# Patient Record
Sex: FEMALE | Race: WHITE | NOT HISPANIC OR LATINO | Employment: PART TIME | URBAN - METROPOLITAN AREA
[De-identification: names, ages, dates, MRNs, and addresses within clinical notes are randomized per-mention and may not be internally consistent; named-entity substitution may affect disease eponyms.]

---

## 2019-08-29 PROBLEM — H10.13 ALLERGIC CONJUNCTIVITIS OF BOTH EYES: Status: ACTIVE | Noted: 2019-08-29

## 2019-08-29 PROBLEM — L20.84 INTRINSIC ATOPIC DERMATITIS: Status: ACTIVE | Noted: 2019-08-29

## 2019-08-29 PROBLEM — J30.9 ALLERGIC RHINITIS: Status: ACTIVE | Noted: 2019-08-29

## 2019-08-29 PROBLEM — K21.9 LARYNGOPHARYNGEAL REFLUX (LPR): Status: ACTIVE | Noted: 2019-08-29

## 2019-08-31 PROBLEM — J30.89 ALLERGIC RHINITIS DUE TO MOLD: Status: ACTIVE | Noted: 2019-08-31

## 2019-08-31 PROBLEM — J30.1 CHRONIC SEASONAL ALLERGIC RHINITIS DUE TO POLLEN: Status: ACTIVE | Noted: 2019-08-31

## 2019-11-12 PROBLEM — R01.1 CARDIAC MURMUR, UNSPECIFIED: Status: ACTIVE | Noted: 2019-11-12

## 2019-11-12 PROBLEM — L40.0 PLAQUE PSORIASIS: Status: ACTIVE | Noted: 2019-11-12

## 2019-11-12 PROBLEM — J30.89 ALLERGIC RHINITIS DUE TO AMERICAN HOUSE DUST MITE: Status: ACTIVE | Noted: 2019-11-12

## 2019-11-12 PROBLEM — K21.00 GASTRO-ESOPHAGEAL REFLUX DISEASE WITH ESOPHAGITIS: Status: ACTIVE | Noted: 2019-11-12

## 2019-11-12 PROBLEM — L21.9 SEBORRHEA: Status: ACTIVE | Noted: 2019-11-12

## 2019-11-12 PROBLEM — E66.9 OBESITY, UNSPECIFIED: Status: ACTIVE | Noted: 2017-04-04

## 2019-11-12 PROBLEM — J45.20 MILD INTERMITTENT ASTHMA, UNCOMPLICATED: Status: ACTIVE | Noted: 2019-11-12

## 2020-05-14 PROBLEM — R13.14 PHARYNGOESOPHAGEAL DYSPHAGIA: Status: ACTIVE | Noted: 2020-05-14

## 2020-05-14 PROBLEM — K21.9 GASTROESOPHAGEAL REFLUX DISEASE WITHOUT ESOPHAGITIS: Status: ACTIVE | Noted: 2020-05-14

## 2020-05-21 ENCOUNTER — APPOINTMENT (OUTPATIENT)
Dept: LAB | Age: 25
End: 2020-05-21
Payer: COMMERCIAL

## 2020-05-21 DIAGNOSIS — K21.9 GASTROESOPHAGEAL REFLUX DISEASE WITHOUT ESOPHAGITIS: ICD-10-CM

## 2020-05-21 DIAGNOSIS — K90.41 GLUTEN INTOLERANCE: ICD-10-CM

## 2020-05-21 PROCEDURE — 83516 IMMUNOASSAY NONANTIBODY: CPT

## 2020-05-21 PROCEDURE — 36415 COLL VENOUS BLD VENIPUNCTURE: CPT

## 2020-05-28 LAB — MISCELLANEOUS LAB TEST RESULT: NORMAL

## 2020-07-06 PROBLEM — K21.9 REFLUX LARYNGITIS: Status: ACTIVE | Noted: 2020-07-06

## 2020-07-06 PROBLEM — J38.3 GLOTTIC INSUFFICIENCY: Status: ACTIVE | Noted: 2020-07-06

## 2020-07-06 PROBLEM — J38.01 PARESIS OF RIGHT VOCAL FOLD: Status: ACTIVE | Noted: 2020-07-06

## 2020-07-06 PROBLEM — E07.89 THYROID FULLNESS: Status: ACTIVE | Noted: 2020-07-06

## 2020-07-06 PROBLEM — J04.0 REFLUX LARYNGITIS: Status: ACTIVE | Noted: 2020-07-06

## 2020-07-06 PROBLEM — R49.0 DYSPHONIA: Status: ACTIVE | Noted: 2020-07-06

## 2020-07-06 PROBLEM — J38.4 LARYNGEAL EDEMA: Status: ACTIVE | Noted: 2020-07-06

## 2020-07-10 ENCOUNTER — HOSPITAL ENCOUNTER (OUTPATIENT)
Dept: RADIOLOGY | Age: 25
Discharge: HOME | End: 2020-07-10
Payer: COMMERCIAL

## 2020-07-10 DIAGNOSIS — Z01.818 PREOP TESTING: ICD-10-CM

## 2020-07-10 DIAGNOSIS — K21.00 GASTRO-ESOPHAGEAL REFLUX DISEASE WITH ESOPHAGITIS: ICD-10-CM

## 2020-07-10 DIAGNOSIS — E07.89 THYROID FULLNESS: ICD-10-CM

## 2020-07-10 PROCEDURE — 76536 US EXAM OF HEAD AND NECK: CPT

## 2020-07-10 PROCEDURE — U0003 INFECTIOUS AGENT DETECTION BY NUCLEIC ACID (DNA OR RNA); SEVERE ACUTE RESPIRATORY SYNDROME CORONAVIRUS 2 (SARS-COV-2) (CORONAVIRUS DISEASE [COVID-19]), AMPLIFIED PROBE TECHNIQUE, MAKING USE OF HIGH THROUGHPUT TECHNOLOGIES AS DESCRIBED BY CMS-2020-01-R: HCPCS

## 2020-07-11 LAB
INPATIENT: NORMAL
SARS-COV-2 RNA SPEC QL NAA+PROBE: NOT DETECTED

## 2020-07-20 ENCOUNTER — HOSPITAL ENCOUNTER (OUTPATIENT)
Dept: GASTROENTEROLOGY | Facility: HOSPITAL | Age: 25
Discharge: HOME | End: 2020-07-20
Attending: OTOLARYNGOLOGY
Payer: COMMERCIAL

## 2020-07-20 VITALS
HEART RATE: 96 BPM | TEMPERATURE: 98.2 F | RESPIRATION RATE: 18 BRPM | SYSTOLIC BLOOD PRESSURE: 133 MMHG | DIASTOLIC BLOOD PRESSURE: 85 MMHG | OXYGEN SATURATION: 99 %

## 2020-07-20 DIAGNOSIS — K21.00 GASTRO-ESOPHAGEAL REFLUX DISEASE WITH ESOPHAGITIS: ICD-10-CM

## 2020-07-20 PROCEDURE — 91038 ESOPH IMPED FUNCT TEST > 1HR: CPT

## 2020-07-20 PROCEDURE — 91020 GASTRIC MOTILITY STUDIES: CPT

## 2020-07-20 NOTE — PERIOPERATIVE NURSING NOTE
Patient brought in the room and educated on procedure  Lidocaine 2% topical solution inserted via nostrils  Manometry catheter inserted via left nostril and secured  10 liquid swallows, 10 viscous swallow and 1 rapid swallow performed  Patient Tolerated procedure  Catheter removed intact   Dual sensor PH probe inserted via left nostril and secured  Zephr recorder teachback performed and patient verbalized understanding  Patient instructed to return next day to have probe remove  Discharge instructions given and patient ambulated out of room in stable condition

## 2020-08-07 ENCOUNTER — APPOINTMENT (OUTPATIENT)
Dept: LAB | Age: 25
End: 2020-08-07
Payer: COMMERCIAL

## 2020-08-07 ENCOUNTER — TRANSCRIBE ORDERS (OUTPATIENT)
Dept: ADMINISTRATIVE | Age: 25
End: 2020-08-07

## 2020-08-07 DIAGNOSIS — D89.89 CHRONIC FATIGUE AND IMMUNE DYSFUNCTION SYNDROME (HCC): ICD-10-CM

## 2020-08-07 DIAGNOSIS — R05.9 COUGH: ICD-10-CM

## 2020-08-07 DIAGNOSIS — J35.1 LINGUAL TONSIL HYPERTROPHY: ICD-10-CM

## 2020-08-07 DIAGNOSIS — K21.9 GASTROESOPHAGEAL REFLUX DISEASE, ESOPHAGITIS PRESENCE NOT SPECIFIED: Primary | ICD-10-CM

## 2020-08-07 DIAGNOSIS — K21.9 GASTROESOPHAGEAL REFLUX DISEASE, ESOPHAGITIS PRESENCE NOT SPECIFIED: ICD-10-CM

## 2020-08-07 DIAGNOSIS — R53.82 CHRONIC FATIGUE AND IMMUNE DYSFUNCTION SYNDROME (HCC): ICD-10-CM

## 2020-08-07 DIAGNOSIS — E55.9 VITAMIN D DEFICIENCY: ICD-10-CM

## 2020-08-07 LAB
25(OH)D3 SERPL-MCNC: 16 NG/ML (ref 30–100)
IGA SERPL-MCNC: 98 MG/DL (ref 70–400)
IGG SERPL-MCNC: 1090 MG/DL (ref 700–1600)
IGM SERPL-MCNC: 72 MG/DL (ref 40–230)
T4 FREE SERPL-MCNC: 1.04 NG/DL (ref 0.76–1.46)

## 2020-08-07 PROCEDURE — 82306 VITAMIN D 25 HYDROXY: CPT

## 2020-08-07 PROCEDURE — 82785 ASSAY OF IGE: CPT

## 2020-08-07 PROCEDURE — 86430 RHEUMATOID FACTOR TEST QUAL: CPT

## 2020-08-07 PROCEDURE — 86618 LYME DISEASE ANTIBODY: CPT

## 2020-08-07 PROCEDURE — 86038 ANTINUCLEAR ANTIBODIES: CPT

## 2020-08-07 PROCEDURE — 36415 COLL VENOUS BLD VENIPUNCTURE: CPT

## 2020-08-07 PROCEDURE — 84439 ASSAY OF FREE THYROXINE: CPT

## 2020-08-07 PROCEDURE — 82784 ASSAY IGA/IGD/IGG/IGM EACH: CPT

## 2020-08-10 LAB
B BURGDOR IGG+IGM SER-ACNC: <0.91 ISR (ref 0–0.9)
RHEUMATOID FACT SER QL LA: NEGATIVE
RYE IGE QN: NEGATIVE
TOTAL IGE SMQN RAST: 92.3 KU/L (ref 0–113)

## 2020-09-09 PROBLEM — K21.00 GASTRO-ESOPHAGEAL REFLUX DISEASE WITH ESOPHAGITIS: Status: RESOLVED | Noted: 2019-11-12 | Resolved: 2020-09-09

## 2020-09-09 PROBLEM — E55.9 VITAMIN D DEFICIENCY: Status: ACTIVE | Noted: 2020-09-09

## 2020-09-23 PROCEDURE — 91038 ESOPH IMPED FUNCT TEST > 1HR: CPT | Performed by: INTERNAL MEDICINE

## 2020-09-23 PROCEDURE — 91010 ESOPHAGUS MOTILITY STUDY: CPT | Performed by: INTERNAL MEDICINE

## 2020-10-05 ENCOUNTER — TELEPHONE (OUTPATIENT)
Dept: DERMATOLOGY | Facility: CLINIC | Age: 25
End: 2020-10-05

## 2020-10-07 ENCOUNTER — OFFICE VISIT (OUTPATIENT)
Dept: DERMATOLOGY | Facility: CLINIC | Age: 25
End: 2020-10-07
Payer: COMMERCIAL

## 2020-10-07 VITALS — TEMPERATURE: 99.8 F

## 2020-10-07 DIAGNOSIS — L40.9 PSORIASIS: Primary | ICD-10-CM

## 2020-10-07 PROCEDURE — 99203 OFFICE O/P NEW LOW 30 MIN: CPT | Performed by: DERMATOLOGY

## 2020-10-07 RX ORDER — FLUOCINONIDE TOPICAL SOLUTION USP, 0.05% 0.5 MG/ML
SOLUTION TOPICAL 2 TIMES DAILY
Qty: 60 ML | Refills: 3 | Status: SHIPPED | OUTPATIENT
Start: 2020-10-07 | End: 2021-05-27

## 2020-10-07 RX ORDER — DESONIDE 0.5 MG/G
CREAM TOPICAL 2 TIMES DAILY
Qty: 15 G | Refills: 2 | Status: SHIPPED | OUTPATIENT
Start: 2020-10-07 | End: 2021-05-27

## 2020-10-13 ENCOUNTER — TELEPHONE (OUTPATIENT)
Dept: DERMATOLOGY | Facility: CLINIC | Age: 25
End: 2020-10-13

## 2020-10-26 ENCOUNTER — LAB (OUTPATIENT)
Dept: LAB | Age: 25
End: 2020-10-26
Payer: COMMERCIAL

## 2020-10-26 DIAGNOSIS — E55.9 VITAMIN D DEFICIENCY: ICD-10-CM

## 2020-10-26 DIAGNOSIS — L40.9 PSORIASIS: ICD-10-CM

## 2020-10-26 LAB — 25(OH)D3 SERPL-MCNC: 36.4 NG/ML (ref 30–100)

## 2020-10-26 PROCEDURE — 36415 COLL VENOUS BLD VENIPUNCTURE: CPT

## 2020-10-26 PROCEDURE — 86480 TB TEST CELL IMMUN MEASURE: CPT

## 2020-10-26 PROCEDURE — 82306 VITAMIN D 25 HYDROXY: CPT

## 2020-10-28 LAB
GAMMA INTERFERON BACKGROUND BLD IA-ACNC: 0.03 IU/ML
M TB IFN-G BLD-IMP: NEGATIVE
M TB IFN-G CD4+ BCKGRND COR BLD-ACNC: -0.01 IU/ML
M TB IFN-G CD4+ BCKGRND COR BLD-ACNC: -0.01 IU/ML
MITOGEN IGNF BCKGRD COR BLD-ACNC: >10 IU/ML

## 2020-11-16 DIAGNOSIS — L40.9 PSORIASIS: ICD-10-CM

## 2020-11-16 RX ORDER — GUSELKUMAB 100 MG/ML
INJECTION SUBCUTANEOUS
Qty: 1 PEN | Refills: 6 | Status: SHIPPED | OUTPATIENT
Start: 2020-11-16 | End: 2021-01-27

## 2021-01-27 ENCOUNTER — OFFICE VISIT (OUTPATIENT)
Dept: DERMATOLOGY | Facility: CLINIC | Age: 26
End: 2021-01-27
Payer: COMMERCIAL

## 2021-01-27 VITALS — WEIGHT: 220 LBS | TEMPERATURE: 99.5 F | HEIGHT: 68 IN | BODY MASS INDEX: 33.34 KG/M2

## 2021-01-27 DIAGNOSIS — L40.9 PSORIASIS: Primary | ICD-10-CM

## 2021-01-27 PROCEDURE — 99213 OFFICE O/P EST LOW 20 MIN: CPT | Performed by: DERMATOLOGY

## 2021-01-27 PROCEDURE — 11900 INJECT SKIN LESIONS </W 7: CPT | Performed by: DERMATOLOGY

## 2021-01-27 RX ORDER — TRIAMCINOLONE ACETONIDE 40 MG/ML
40 INJECTION, SUSPENSION INTRA-ARTICULAR; INTRAMUSCULAR ONCE
Status: COMPLETED | OUTPATIENT
Start: 2021-01-27 | End: 2021-01-27

## 2021-01-27 RX ORDER — GUSELKUMAB 100 MG/ML
INJECTION SUBCUTANEOUS
COMMUNITY
Start: 2020-11-05 | End: 2021-04-15 | Stop reason: ALTCHOICE

## 2021-01-27 RX ORDER — AZELASTINE 1 MG/ML
SPRAY, METERED NASAL
COMMUNITY
Start: 2020-11-17 | End: 2021-05-27

## 2021-01-27 RX ORDER — FLUCONAZOLE 150 MG/1
TABLET ORAL
COMMUNITY
End: 2021-05-27

## 2021-01-27 RX ADMIN — TRIAMCINOLONE ACETONIDE 40 MG: 40 INJECTION, SUSPENSION INTRA-ARTICULAR; INTRAMUSCULAR at 17:06

## 2021-01-27 NOTE — PATIENT INSTRUCTIONS
Assessment and Plan:  Based on a thorough discussion of this condition and the management approach to it (including a comprehensive discussion of the known risks, side effects and potential benefits of treatment), the patient (family) agrees to implement the following specific plan:   Continue Tremfya injection   Contact office if any issues develop   Kenalog injection administered today   Follow up end of April or beginning of May

## 2021-01-27 NOTE — PROGRESS NOTES
[facility] Follow Up Note    Patient Name: Hebert Melgar  Encounter Date: 01/27/2021    Today's Chief Concerns:  Lukas Jamar Concern #1:  Follow up for Tremfya     Current Medications:    Current Outpatient Medications:     aluminum hydroxide-magnesium carbonate (GAVISCON)  mg/15 mL oral suspension, Take 15 mL by mouth daily at bedtime, Disp: 355 mL, Rfl: 5    azelastine (ASTELIN) 0 1 % nasal spray, 1 2 SPRAYS INTO EACH NOSTRIL 2 (TWO) TIMES A DAY FOR 30 DAYS  USE IN EACH NOSTRIL AS DIRECTED, Disp: , Rfl:     cetirizine (ZyrTEC) 10 mg tablet, Take 10 mg by mouth daily, Disp: , Rfl:     desonide (DESOWEN) 0 05 % cream, Apply topically 2 (two) times a day, Disp: 15 g, Rfl: 2    famotidine (PEPCID) 20 mg tablet, Take 20 mg by mouth daily before dinner, Disp: , Rfl:     fluocinonide (LIDEX) 0 05 % external solution, Apply topically 2 (two) times a day, Disp: 60 mL, Rfl: 3    fluticasone (FLONASE) 50 mcg/act nasal spray, 1 spray into each nostril daily, Disp: , Rfl:     guselkumab (Tremfya) subcutaneous injection, , Disp: , Rfl:     ibuprofen (MOTRIN) 600 mg tablet, Take 600 mg by mouth 3 (three) times a day with meals, Disp: , Rfl: 0    LARISSIA 0 1-20 MG-MCG per tablet, TAKE 1 TABLET (20 MCG TOTAL) BY MOUTH DAILY  , Disp: , Rfl: 3    omeprazole (PriLOSEC) 40 MG capsule, TAKE 1 CAPSULE BY MOUTH EVERY DAY BEFORE BREAKFAST, Disp: 90 capsule, Rfl: 1    Albuterol Sulfate 108 (90 Base) MCG/ACT AEPB, Inhale, Disp: , Rfl:     cyclobenzaprine (FLEXERIL) 5 mg tablet, Take 5 mg by mouth daily at bedtime, Disp: , Rfl: 0    ergocalciferol (VITAMIN D2) 50,000 units, Take 1 capsule (50,000 Units total) by mouth once a week for 8 doses, Disp: 8 capsule, Rfl: 0    fluconazole (DIFLUCAN) 150 mg tablet, , Disp: , Rfl:     norethindrone-ethinyl estradiol-iron (ESTROSTEP FE) 1-20/1-30/1-35 MG-MCG TABS, Take by mouth daily, Disp: , Rfl:     Secukinumab 150 MG/ML SOSY, Inject 150 mg under the skin every 28 days X2, Disp: , Rfl:     CONSTITUTIONAL:   Vitals:    01/27/21 1610   Temp: 99 5 °F (37 5 °C)   Weight: 99 8 kg (220 lb)   Height: 5' 8" (1 727 m)           Specific Alerts:    Have you been seen by a [facility]atologist in the last 3 years? YES    Are you pregnant or planning to become pregnant? No    Are you currently or planning to be nursing or breast feeding? No    Allergies   Allergen Reactions    Cefixime Hives     As an infant - has taken amox/pcn/augmentin w/o reaction        May we call your Preferred Phone number to discuss your specific medical information? YES    May we leave a detailed message that includes your specific medical information? YES    Have you traveled outside of the [facility] in the past 3 months? No    Do you currently have a pacemaker or defibrillator? No    Do you have any artificial heart valves, joints, plates, screws, rods, stents, pins, etc? No   - If Yes, were any placed within the last 2 years? Do you require any medications prior to a surgical procedure? No   - If Yes, for which procedure? N/A   - If Yes, what medications to you require? N/A    Are you taking any medications that cause you to bleed more easily ("blood thinners") No    Have you ever experienced a rapid heartbeat with epinephrine? No    Have you ever been treated with "gold" (gold sodium thiomalate) therapy? No    [facility] can help with wrinkles, "laugh lines," facial volume loss, "double chin," "love handles," age spots, and more  Are you interested in learning today about some of the skin enhancement procedures that we offer? (If Yes, please provide more detail) No    Review of Systems:  Have you recently had or currently have any of the following?     · Fever or chills: No  · Night Sweats: No  · Headaches: YES, not all the time  · Weight Gain: No  · Weight Loss: No  · Blurry Vision: No  · Nausea: No  · Vomiting: No  · Diarrhea: No  · Blood in Stool: No  · Abdominal Pain: No  · Itchy Skin: YES  · Painful Joints: No  · Swollen Joints: No  · Muscle Pain: YES, not all the time  · Irregular Mole: No  · Sun Burn: No  · Dry Skin: YES  · Skin Color Changes: No  · Scar or Keloid: yes, keliod  · Cold Sores/Fever Blisters: No  · Bacterial Infections/MRSA: No  · Anxiety: YES  · Depression: No  · Suicidal or Homicidal Thoughts: No      PSYCH: Normal mood and affect  EYES: Normal conjunctiva  ENT: Normal lips and oral mucosa  CARDIOVASCULAR: No edema  RESPIRATORY: Normal respirations  HEME/LYMPH/IMMUNO:  No regional lymphadenopathy except as noted below in ASSESSMENT AND PLAN BY DIAGNOSIS    FULL ORGAN SYSTEM SKIN EXAM (SKIN)   Hair, Scalp, Ears, Face Normal except as noted below in Assessment   Neck, Cervical Chain Nodes Normal except as noted below in Assessment   Right Arm/Hand/Fingers Normal except as noted below in Assessment   Left Arm/Hand/Fingers Normal except as noted below in Assessment     PSORIASIS    Physical Exam:   Anatomic Location Affected:  Scalp, behind left ear   Morphological Description:  Diffuse thick scale adhered to scalp, thick erythametous plaques on occipital scalp and right frontal scalp   Severity: moderate    Body Percent Affected: <5%       Additional History of Present Condition:  Patient is present to follow up on scalp psoriasis since Tremfya treatment has been started  Denies palpitations  cosentyx worked the fastest for her but she thinks it was temporally related to her palpitations       Assessment and Plan:  Based on a thorough discussion of this condition and the management approach to it (including a comprehensive discussion of the known risks, side effects and potential benefits of treatment), the patient (family) agrees to implement the following specific plan:   Continue Tremfya injection - her 3rd one is due next week    Contact office if any issues develop   Kenalog injection administered today   Follow up end of April or beginning of May which will be post 4th injection     PROCEDURE:  INTRALESIONAL STEROID INJECTION (KENALOG INJECTION)    Purpose: Triamcinolone is a synthetic glucocorticoid corticosteroid that has marked anti-inflammatory action  It is prepared in sterile aqueous suspension suitable for injecting directly into a lesion on or immediately below the skin to treat a dermal inflammatory process  Indications: It is indicated for alopecia areata; inflammatory acne cysts; discoid lupus erythematosus; keloids and hypertrophic scars; inflammatory lesions of granuloma annulare, lichen planus, lichen simplex chronicus (neurodermatitis), psoriatic plaques, and other localized inflammatory skin conditions  Potential Side Effects: I understand that triamcinolone injection can potentially cause early and/or delayed adverse effects such as:    Pain    Impaired wound healing    Increased hair growth    Bleeding    White or brown marks    Steroid acne    Infection    Telangiectasia    Skin thinning    Cutaneous and subcutaneous lipoatrophy (most common) appearing as skin indentations or dimples around the injection sites a few weeks after treatment     PROCEDURE NOTE:  After verbal and written consent were obtained, the to-be-treated area was wiped and cleaned with rubbing alcohol 70%  Then, a total of 2 mL of Kenalog CONCENTRATION:  40 mg/mL   (Lot# [id number]; Expiration [id number], NDC#: 3644-4446-80) was injected intralesionally in divided 0 1 cc doses into a total of 2 lesion/s on the following anatomic areas:  Right frontal scalp and occipital scalp using a 1-mL syringe and a 30 and 1/2-gauge needle  There was less than 1 mL of blood loss and little to no discomfort  The area was bandaged with a Band-aid  The patient tolerated the procedure well and remained in the office for observation  With no signs of an adverse reaction, the patient was eventually discharged from clinic        Scribe Attestation    I,:  Darinel White am acting as a scribe while in the presence of the attending physician :       I,:  John Andrade MD personally performed the services described in this documentation    as scribed in my presence :

## 2021-04-15 ENCOUNTER — TELEPHONE (OUTPATIENT)
Dept: DERMATOLOGY | Facility: CLINIC | Age: 26
End: 2021-04-15

## 2021-04-15 ENCOUNTER — OFFICE VISIT (OUTPATIENT)
Dept: DERMATOLOGY | Facility: CLINIC | Age: 26
End: 2021-04-15
Payer: COMMERCIAL

## 2021-04-15 VITALS — TEMPERATURE: 98.4 F | WEIGHT: 226 LBS | BODY MASS INDEX: 34.25 KG/M2 | HEIGHT: 68 IN

## 2021-04-15 DIAGNOSIS — L71.9 ROSACEA: ICD-10-CM

## 2021-04-15 DIAGNOSIS — L40.9 PSORIASIS: Primary | ICD-10-CM

## 2021-04-15 PROCEDURE — 99213 OFFICE O/P EST LOW 20 MIN: CPT | Performed by: DERMATOLOGY

## 2021-04-15 RX ORDER — SECUKINUMAB 150 MG/ML
INJECTION SUBCUTANEOUS
Qty: 2 ML | Refills: 8 | Status: SHIPPED | OUTPATIENT
Start: 2021-04-15 | End: 2021-04-26 | Stop reason: ALTCHOICE

## 2021-04-15 NOTE — PROGRESS NOTES
[facility] Follow Up Note    Patient Name: Cammie Miller  Encounter Date: 4/15/2021    Today's Chief Concerns:  Scott Brasherdequan Concern #1:  Psoriasis Follow Up      Current Medications:    Current Outpatient Medications:     Albuterol Sulfate 108 (90 Base) MCG/ACT AEPB, Inhale, Disp: , Rfl:     aluminum hydroxide-magnesium carbonate (GAVISCON)  mg/15 mL oral suspension, Take 15 mL by mouth daily at bedtime, Disp: 355 mL, Rfl: 5    azelastine (ASTELIN) 0 1 % nasal spray, 1 2 SPRAYS INTO EACH NOSTRIL 2 (TWO) TIMES A DAY FOR 30 DAYS  USE IN EACH NOSTRIL AS DIRECTED, Disp: , Rfl:     cetirizine (ZyrTEC) 10 mg tablet, Take 10 mg by mouth daily, Disp: , Rfl:     cyclobenzaprine (FLEXERIL) 5 mg tablet, Take 5 mg by mouth daily at bedtime, Disp: , Rfl: 0    desonide (DESOWEN) 0 05 % cream, Apply topically 2 (two) times a day, Disp: 15 g, Rfl: 2    famotidine (PEPCID) 20 mg tablet, Take 20 mg by mouth daily before dinner, Disp: , Rfl:     fluconazole (DIFLUCAN) 150 mg tablet, , Disp: , Rfl:     fluocinonide (LIDEX) 0 05 % external solution, Apply topically 2 (two) times a day, Disp: 60 mL, Rfl: 3    fluticasone (FLONASE) 50 mcg/act nasal spray, 1 spray into each nostril daily, Disp: , Rfl:     guselkumab (Tremfya) subcutaneous injection, , Disp: , Rfl:     ibuprofen (MOTRIN) 600 mg tablet, Take 600 mg by mouth 3 (three) times a day with meals, Disp: , Rfl: 0    LARISSIA 0 1-20 MG-MCG per tablet, TAKE 1 TABLET (20 MCG TOTAL) BY MOUTH DAILY  , Disp: , Rfl: 3    norethindrone-ethinyl estradiol-iron (ESTROSTEP FE) 1-20/1-30/1-35 MG-MCG TABS, Take by mouth daily, Disp: , Rfl:     omeprazole (PriLOSEC) 40 MG capsule, TAKE 1 CAPSULE BY MOUTH EVERY DAY BEFORE BREAKFAST, Disp: 90 capsule, Rfl: 1    Secukinumab 150 MG/ML SOSY, Inject 150 mg under the skin every 28 days X2, Disp: , Rfl:     ergocalciferol (VITAMIN D2) 50,000 units, Take 1 capsule (50,000 Units total) by mouth once a week for 8 doses, Disp: 8 capsule, Rfl: 0    CONSTITUTIONAL:   Vitals:    04/15/21 0801   Temp: 98 4 °F (36 9 °C)   Weight: 103 kg (226 lb)   Height: 5' 8" (1 727 m)       Specific Alerts:    Have you been seen by a [facility]ologist in the last 3 years? YES    Are you pregnant or planning to become pregnant? No    Are you currently or planning to be nursing or breast feeding? No    Allergies   Allergen Reactions    Cefixime Hives     As an infant - has taken amox/pcn/augmentin w/o reaction        May we call your Preferred Phone number to discuss your specific medical information? YES    May we leave a detailed message that includes your specific medical information? YES    Have you traveled outside of the [facility] in the past 3 months? No    Do you currently have a pacemaker or defibrillator? No    Do you have any artificial heart valves, joints, plates, screws, rods, stents, pins, etc? No   - If Yes, were any placed within the last 2 years? Do you require any medications prior to a surgical procedure? No   - If Yes, for which procedure? - If Yes, what medications to you require? Are you taking any medications that cause you to bleed more easily ("blood thinners") No    Have you ever experienced a rapid heartbeat with epinephrine? No    Have you ever been treated with "gold" (gold sodium thiomalate) therapy? No    [address] [facility] can help with wrinkles, "laugh lines," facial volume loss, "double chin," "love handles," age spots, and more  Are you interested in learning today about some of the skin enhancement procedures that we offer? (If Yes, please provide more detail) No    Review of Systems:  Have you recently had or currently have any of the following?     · Fever or chills: No  · Night Sweats: No  · Headaches: No  · Weight Gain: No  · Weight Loss: No  · Blurry Vision: No  · Nausea: No  · Vomiting: No  · Diarrhea: No  · Blood in Stool: No  · Abdominal Pain: No  · Itchy Skin: No  · Painful Joints: No  · Swollen Joints: No  · Muscle Pain: No  · Irregular Mole: No  · Sun Burn: No  · Dry Skin: No  · Skin Color Changes: No  · Scar or Keloid: No  · Cold Sores/Fever Blisters: No  · Bacterial Infections/MRSA: No  · Anxiety: No  · Depression: No  · Suicidal or Homicidal Thoughts: No      PSYCH: Normal mood and affect  EYES: Normal conjunctiva  ENT: Normal lips and oral mucosa  CARDIOVASCULAR: No edema  RESPIRATORY: Normal respirations  HEME/LYMPH/IMMUNO:  No regional lymphadenopathy except as noted below in ASSESSMENT AND PLAN BY DIAGNOSIS    FULL ORGAN SYSTEM SKIN EXAM (SKIN)  Hair, Scalp, Ears, Face Normal except as noted below in Assessment   Neck, Cervical Chain Nodes Normal except as noted below in Assessment   Right Arm/Hand/Fingers Normal except as noted below in Assessment   Left Arm/Hand/Fingers Normal except as noted below in Assessment       PSORIASIS     Physical Exam:  · Anatomic Location Affected:  Scalp, behind left ear  · Morphological Description:  Diffuse thick scale adhered to scalp  · Severity: moderate   · Body Percent Affected: <5%         Additional History of Present Condition:  Patient states is not seeing an improvement with the Tremfya - she is 2 weeks post her 4th injection  She had previously taken Cosentyx for her Psoriasis, which she feels helped  Currently, she feels her skin is slightly better; but feels that is because she is using her topical medications more in conjunction with the Tremfya  Needing to use her topicals continuously and flares as soon as she stops     Assessment and Plan:  Based on a thorough discussion of this condition and the management approach to it (including a comprehensive discussion of the known risks, side effects and potential benefits of treatment), the patient (family) agrees to implement the following specific plan:  · Discussed stopping Tremfya and starting Cosentyx    · The ultimate goal is to be able to stop using topical medications completely and be clear with only the injectable  · Discontinue use of Tremfya  She has failed tremfya and topical steroids   · Begin using Cosentyx  Start with 300 mg injection on weeks 0, 1, 2, 3, and 4    Beginning at week 8 inject 300 mg once monthly         ROSACEA  Physical Exam:   Anatomic Location Affected:  Whole face   Morphological Description:  Diffuse erythema  Additional History of Present Condition:  Not used any prescription treatments before     Assessment and Plan:  Based on a thorough discussion of this condition and the management approach to it (including a comprehensive discussion of the known risks, side effects and potential benefits of treatment), the patient (family) agrees to implement the following specific plan:   Start rhofade qAM - sent thru [facility]           Scribe Attestation    I,:  Raina Armenta am acting as a scribe while in the presence of the attending physician :       I,:  Patricia Raza MD personally performed the services described in this documentation    as scribed in my presence :

## 2021-04-15 NOTE — TELEPHONE ENCOUNTER
Prior auth paper work completed, signed by Dr Teresa Rivera and sent to [address] [place], [place] [address]rachell

## 2021-04-15 NOTE — PROGRESS NOTES
[facility] Follow Up Note    Patient Name: Daniel Green  Encounter Date: ***    Today's Chief Concerns:  Krystal Alejandre Concern #1:  Willie Nichols Concern #2:  Willie Nichols Concern #3:  ***    Current Medications:    Current Outpatient Medications:     Albuterol Sulfate 108 (90 Base) MCG/ACT AEPB, Inhale, Disp: , Rfl:     aluminum hydroxide-magnesium carbonate (GAVISCON)  mg/15 mL oral suspension, Take 15 mL by mouth daily at bedtime, Disp: 355 mL, Rfl: 5    azelastine (ASTELIN) 0 1 % nasal spray, 1 2 SPRAYS INTO EACH NOSTRIL 2 (TWO) TIMES A DAY FOR 30 DAYS  USE IN EACH NOSTRIL AS DIRECTED, Disp: , Rfl:     cetirizine (ZyrTEC) 10 mg tablet, Take 10 mg by mouth daily, Disp: , Rfl:     cyclobenzaprine (FLEXERIL) 5 mg tablet, Take 5 mg by mouth daily at bedtime, Disp: , Rfl: 0    desonide (DESOWEN) 0 05 % cream, Apply topically 2 (two) times a day, Disp: 15 g, Rfl: 2    ergocalciferol (VITAMIN D2) 50,000 units, Take 1 capsule (50,000 Units total) by mouth once a week for 8 doses, Disp: 8 capsule, Rfl: 0    famotidine (PEPCID) 20 mg tablet, Take 20 mg by mouth daily before dinner, Disp: , Rfl:     fluconazole (DIFLUCAN) 150 mg tablet, , Disp: , Rfl:     fluocinonide (LIDEX) 0 05 % external solution, Apply topically 2 (two) times a day, Disp: 60 mL, Rfl: 3    fluticasone (FLONASE) 50 mcg/act nasal spray, 1 spray into each nostril daily, Disp: , Rfl:     guselkumab (Tremfya) subcutaneous injection, , Disp: , Rfl:     ibuprofen (MOTRIN) 600 mg tablet, Take 600 mg by mouth 3 (three) times a day with meals, Disp: , Rfl: 0    LARISSIA 0 1-20 MG-MCG per tablet, TAKE 1 TABLET (20 MCG TOTAL) BY MOUTH DAILY  , Disp: , Rfl: 3    norethindrone-ethinyl estradiol-iron (ESTROSTEP FE) 1-20/1-30/1-35 MG-MCG TABS, Take by mouth daily, Disp: , Rfl:     omeprazole (PriLOSEC) 40 MG capsule, TAKE 1 CAPSULE BY MOUTH EVERY DAY BEFORE BREAKFAST, Disp: 90 capsule, Rfl: 1    Secukinumab 150 MG/ML SOSY, Inject 150 mg under the skin every 28 days X2, Disp: , Rfl:     CONSTITUTIONAL:   There were no vitals filed for this visit  Today's Height:   ***  Today's Weight (in kilograms):   ***  Today's Temperature:   ***    Specific Alerts:    Have you been seen by a [facility] in the last 3 years? {YES/NO:40059}    Are you pregnant or planning to become pregnant? {YES/NO/NA:41923}    Are you currently or planning to be nursing or breast feeding? {YES/NO/NA:50985}    Allergies   Allergen Reactions    Cefixime Hives     As an infant - has taken amox/pcn/augmentin w/o reaction        May we call your Preferred Phone number to discuss your specific medical information? {YES/NO:23631}    May we leave a detailed message that includes your specific medical information? {YES/NO:06530}    Have you traveled outside of the [facility] in the past 3 months? {YES/NO:78867}    Do you currently have a pacemaker or defibrillator? {YES/NO:85411}    Do you have any artificial heart valves, joints, plates, screws, rods, stents, pins, etc? {YES/NO:45321}   - If Yes, were any placed within the last 2 years? Do you require any medications prior to a surgical procedure? {YES/NO:12377}   - If Yes, for which procedure? ***   - If Yes, what medications to you require? ***    Are you taking any medications that cause you to bleed more easily ("blood thinners") {YES/NO:02439}    Have you ever experienced a rapid heartbeat with epinephrine? {YES/NO:52263}    Have you ever been treated with "gold" (gold sodium thiomalate) therapy? {YES/NO:95219}    [facility] can help with wrinkles, "laugh lines," facial volume loss, "double chin," "love handles," age spots, and more  Are you interested in learning today about some of the skin enhancement procedures that we offer? (If Yes, please provide more detail) {YES/NO W/DESCRIPTION:78532}    Review of Systems:  Have you recently had or currently have any of the following?     · Fever or chills: {YES/NO:60171}  · Night Sweats: {YES/NO:48079}  · Headaches: {YES/NO:11320}  · Weight Gain: {YES/NO:61304}  · Weight Loss: {YES/NO:95484}  · Blurry Vision: {YES/NO:06696}  · Nausea: {YES/NO:10909}  · Vomiting: {YES/NO:55070}  · Diarrhea: {YES/NO:84712}  · Blood in Stool: {YES/NO:36224}  · Abdominal Pain: {YES/NO:38669}  · Itchy Skin: {YES/NO:90931}  · Painful Joints: {YES/NO:60269}  · Swollen Joints: {YES/NO:80483}  · Muscle Pain: {YES/NO:95774}  · Irregular Mole: {YES/NO:68880}  · Sun Burn: {YES/NO:13304}  · Dry Skin: {YES/NO:08219}  · Skin Color Changes: {YES/NO:48613}  · Scar or Keloid: {YES/NO:72166}  · Cold Sores/Fever Blisters: {YES/NO:65350}  · Bacterial Infections/MRSA: {YES/NO:49104}  · Anxiety: {YES/NO:16949}  · Depression: {YES/NO:31367}  · Suicidal or Homicidal Thoughts: {YES/NO:53795}      PSYCH: Normal mood and affect  EYES: Normal conjunctiva  ENT: Normal lips and oral mucosa  CARDIOVASCULAR: No edema  RESPIRATORY: Normal respirations  HEME/LYMPH/IMMUNO:  No regional lymphadenopathy except as noted below in ASSESSMENT AND PLAN BY DIAGNOSIS    FULL ORGAN SYSTEM SKIN EXAM (SKIN) *** DELETE WHEN COMPLETE  Hair, Scalp, Ears, Face Normal except as noted below in Assessment   Neck, Cervical Chain Nodes Normal except as noted below in Assessment   Right Arm/Hand/Fingers Normal except as noted below in Assessment   Left Arm/Hand/Fingers Normal except as noted below in Assessment   Chest/Breasts/Axillae Viewed areas Normal except as noted below in Assessment   Abdomen, Umbilicus Normal except as noted below in Assessment   Back/Spine Normal except as noted below in Assessment   Groin/Genitalia/Buttocks Viewed areas Normal except as noted below in Assessment   Right Leg, Foot, Toes Normal except as noted below in Assessment   Left Leg, Foot, Toes Normal except as noted below in Assessment       TYPE HERE

## 2021-04-15 NOTE — PATIENT INSTRUCTIONS
PSORIASIS     Assessment and Plan:  Based on a thorough discussion of this condition and the management approach to it (including a comprehensive discussion of the known risks, side effects and potential benefits of treatment), the patient (family) agrees to implement the following specific plan:  · Discussed stopping Tremfya and starting a 'cousin' medication of Cosentyx  · The ultimate goal is to be able to stop using topical medications completely and be clear with only the injectable  · Discontinue use of Tremfya  · Begin using Cosentyx  Start with 300 mg injection on weeks 0, 1, 2, 3, and 4  Beginning at week 8 inject 300 mg once monthly

## 2021-04-15 NOTE — TELEPHONE ENCOUNTER
Prior auth fax received from [place]  It was scanned into pt MR  Please review

## 2021-04-26 DIAGNOSIS — L40.9 PSORIASIS: Primary | ICD-10-CM

## 2021-05-04 ENCOUNTER — TELEPHONE (OUTPATIENT)
Dept: DERMATOLOGY | Facility: CLINIC | Age: 26
End: 2021-05-04

## 2021-05-14 NOTE — TELEPHONE ENCOUNTER
Dinesh Ramirez was approved  Can you please call and check if she was able to get the medicine and get it started?

## 2021-05-17 NOTE — TELEPHONE ENCOUNTER
Left message for pt asking that she call the office back to confirm if she was able to  her medication   used

## 2021-05-18 NOTE — TELEPHONE ENCOUNTER
Pt returned call, stated that she is still working on getting the medication  Asked pt to contact us when she does get the medication and/or if she has any problems

## 2021-06-02 ENCOUNTER — TELEPHONE (OUTPATIENT)
Dept: DERMATOLOGY | Facility: CLINIC | Age: 26
End: 2021-06-02

## 2021-06-02 NOTE — TELEPHONE ENCOUNTER
Pt advised she did rec medication lxekizumab and wants to know if its ok to start this since she is now on an antibiotic (amoxicillin and predisone), please advise asap

## 2021-06-08 PROBLEM — Z01.419 ENCOUNTER FOR GYNECOLOGICAL EXAMINATION WITHOUT ABNORMAL FINDING: Status: ACTIVE | Noted: 2018-01-10

## 2021-07-15 ENCOUNTER — TELEPHONE (OUTPATIENT)
Dept: BARIATRICS | Facility: CLINIC | Age: 26
End: 2021-07-15

## 2021-07-15 NOTE — TELEPHONE ENCOUNTER
----- Message from Rhonda Villarreal MD sent at 7/14/2021  9:45 AM EDT -----  Please schedule this patient for a new reflux consult   ----- Message -----  From: Meghan Luna MD  Sent: 7/13/2021  12:29 PM EDT  To: Rhonda Villarreal MD    Sending new referral for this pt - reflux surgery, possible bariatric surgery considerations  Her insurance will change in Sept   Not sure if previous referral from 2020 was pursued by your office

## 2021-07-30 ENCOUNTER — TELEPHONE (OUTPATIENT)
Dept: DERMATOLOGY | Facility: CLINIC | Age: 26
End: 2021-07-30

## 2021-08-23 ENCOUNTER — TELEPHONE (OUTPATIENT)
Dept: DERMATOLOGY | Facility: CLINIC | Age: 26
End: 2021-08-23

## 2021-08-23 NOTE — TELEPHONE ENCOUNTER
Michelle Castaneda from [facility] called stating that a form for the Reidne Call is being sent over today  She stated that if it is not received that we can call to verbally complete the form  To Verbally complete the form call [phone]      Any other question can call [phone]

## 2021-08-23 NOTE — TELEPHONE ENCOUNTER
Called and spoke [facility] to to fill out PLQE for the pt's Helen Bustillo  I was told that the PA was already done and now that I filled out another PLQE there is a second PA on file for the pt        The PA from April includes the loading dose and the maintenance doses  The PA from today includes only maintenance doses        There is no need for a second loading dose  The person I spoke to was unsure of why Claudia Killian from the pharmacy was asking for a second loading dose to be authorized

## 2021-08-23 NOTE — TELEPHONE ENCOUNTER
Called and spoke [facility] to to fill out PLQE for the pt's Sameul Her  I was told that the PA was already done and now that I filled out another PLQE there is a second PA on file for the pt  The PA from April includes the loading dose and the maintenance doses  The PA from today includes only maintenance doses  There is no need for a second loading dose  The person I spoke to was unsure of why Yeny Rogers from the pharmacy was asking for a second loading dose to be authorized

## 2021-08-23 NOTE — TELEPHONE ENCOUNTER
Voicemail left by Alivia Kumar from [employer] stating patient did not get the second loading dose of Taltz and that it requires a PA  Stating they were faxing the information today  If nothing was received a call can be made to PA team at [phone] and fill out a PQLE form over the phone

## 2021-09-02 NOTE — TELEPHONE ENCOUNTER
Returned pt's phone call  Left a message stating the Jacobo Pair has a prior authorization and I spoke to [facility] and did a second authorization at their request   By their own admission there should be no issue with the pt getting her medication  Advised the pt to call [facility] directly and confirm with them that they have the prior auth and the correct shipping info for her  Left the phone number for the pharmacy on the voicemail  Asked that she call back with any questions or concerns

## 2021-09-02 NOTE — TELEPHONE ENCOUNTER
Spoke to Dorina Hills at [facility] regarding the pt's Jason Bending order  They state there is a prior auth on file that is good until 4/2022  The confusion is because they can't find record of the pt receiving her loading dose  I confirmed with the pt and the pharmacy she has received the loading dose and has taken it and she is now overdue for her maintenance doses  Per Yuriy Groves he has to put an override through in their system to get the maintenance dose sent to the pt, because according to what he is seeing she isn't due for it until 9/28/21, which is incorrect  Per Yuriy Groves there overrides were put through correctly on their end and he is unsure why the shipping schedule has been set up the way it has  He informed me I need to speak to the help desk about it  Help Desk phone [phone]  The rep spoke to the help desk and tried to get them to do an override, but was told there is a specific department that has to do the override for the pt's prescription  Everything has been resolved on [facility]'s end per the rep  The maintenance dose has been approved under the existing prior auth  The next dose will ship 9/8 directly to the pt  And every 28 days there after

## 2021-09-02 NOTE — TELEPHONE ENCOUNTER
Patient called back, she spoke with pharmacy and for some reason they got the PA request but prescription does not match medication that approved  Please call [facility] specialty pharmacy and reify what medication they need information on

## 2021-09-08 ENCOUNTER — TELEPHONE (OUTPATIENT)
Dept: DERMATOLOGY | Facility: CLINIC | Age: 26
End: 2021-09-08

## 2021-09-08 NOTE — TELEPHONE ENCOUNTER
She should treat the upcoming dose (tomorrow) as her 3rd and continue with the treatment as she normally would  She should have a follow up about 2-3 months after she started her injectable  If it's already been that long, an appointment as soon as possible is fine  Thanks, Mark Luna   Please let her know and help schedule her follow-up

## 2021-09-08 NOTE — TELEPHONE ENCOUNTER
Attempt to reach patient  Left a detailed message relying information below and for patient to call and schedule a follow up appointment

## 2021-09-08 NOTE — TELEPHONE ENCOUNTER
Patient left a message stating she is about 2 weeks over due to take her third dose of Jarrett Magallon which she is going to take tomorrow but that actually her 4th dose would be due tomorrow but she is behind and asking if it is okay to stay with the third dose tomorrow and then do the 4th dose when it would be due next? Patient asking how a follow up should be handled   Thank you

## 2021-09-13 ENCOUNTER — OFFICE VISIT (OUTPATIENT)
Dept: DERMATOLOGY | Facility: CLINIC | Age: 26
End: 2021-09-13
Payer: COMMERCIAL

## 2021-09-13 VITALS — WEIGHT: 232 LBS | TEMPERATURE: 99.4 F | HEIGHT: 68 IN | BODY MASS INDEX: 35.16 KG/M2

## 2021-09-13 DIAGNOSIS — L40.9 PSORIASIS: ICD-10-CM

## 2021-09-13 DIAGNOSIS — L71.9 ROSACEA: Primary | ICD-10-CM

## 2021-09-13 PROCEDURE — 99213 OFFICE O/P EST LOW 20 MIN: CPT | Performed by: DERMATOLOGY

## 2021-09-13 NOTE — PROGRESS NOTES
[facility] Follow Up Note    Patient Name: Kristopher Ward  Encounter Date: 9/13/2021    Today's Chief Concerns:  Learta January Concern #1:  Follow up psoriasis       Current Medications:    Current Outpatient Medications:     cetirizine (ZyrTEC) 10 mg tablet, Take 10 mg by mouth daily, Disp: , Rfl:     famotidine (PEPCID) 20 mg tablet, Take 20 mg by mouth daily before dinner, Disp: , Rfl:     fluticasone (FLONASE) 50 mcg/act nasal spray, 1 spray into each nostril daily, Disp: 18 2 mL, Rfl: 11    ibuprofen (MOTRIN) 600 mg tablet, Take 600 mg by mouth 3 (three) times a day with meals, Disp: , Rfl: 0    Ixekizumab 80 MG/ML SOAJ, 160 mg SC for first dose then 80 mg every 2 weeks for three months then once a month starting week 16, Disp: 2 Syringe, Rfl: 8    LARISSIA 0 1-20 MG-MCG per tablet, TAKE 1 TABLET (20 MCG TOTAL) BY MOUTH DAILY  , Disp: , Rfl: 3    omeprazole (PriLOSEC) 40 MG capsule, TAKE 1 CAPSULE BY MOUTH EVERY DAY BEFORE BREAKFAST, Disp: 90 capsule, Rfl: 1    Albuterol Sulfate 108 (90 Base) MCG/ACT AEPB, Inhale (Patient not taking: Reported on 9/13/2021), Disp: , Rfl:     aluminum hydroxide-magnesium carbonate (GAVISCON)  mg/15 mL oral suspension, Take 15 mL by mouth daily at bedtime (Patient not taking: Reported on 9/13/2021), Disp: 355 mL, Rfl: 5    CONSTITUTIONAL:   Vitals:    09/13/21 1047   Temp: 99 4 °F (37 4 °C)   TempSrc: Tympanic   Weight: 105 kg (232 lb)   Height: 5' 8" (1 727 m)         Specific Alerts:    Have you been seen by a [facility] in the last 3 years? YES    Are you pregnant or planning to become pregnant? No    Are you currently or planning to be nursing or breast feeding? No    Allergies   Allergen Reactions    Cefixime Hives     As an infant - has taken amox/pcn/augmentin w/o reaction     Amoxicillin Rash     Day 6 6/8/2021 - see allergist note       May we call your Preferred Phone number to discuss your specific medical information?  YES    May we leave a detailed message that includes your specific medical information? YES    Have you traveled outside of the [facility] [place] in the past 3 months? No    Do you currently have a pacemaker or defibrillator? No    Do you have any artificial heart valves, joints, plates, screws, rods, stents, pins, etc? No   - If Yes, were any placed within the last 2 years? Do you require any medications prior to a surgical procedure? No   - If Yes, for which procedure? - If Yes, what medications to you require? Are you taking any medications that cause you to bleed more easily ("blood thinners") No    Have you ever experienced a rapid heartbeat with epinephrine? No    Have you ever been treated with "gold" (gold sodium thiomalate) therapy? No    [address] [facility] can help with wrinkles, "laugh lines," facial volume loss, "double chin," "love handles," age spots, and more  Are you interested in learning today about some of the skin enhancement procedures that we offer? (If Yes, please provide more detail) No    Review of Systems:  Have you recently had or currently have any of the following?     · Fever or chills: No  · Night Sweats: No  · Headaches: No  · Weight Gain: No  · Weight Loss: No  · Blurry Vision: No  · Nausea: No  · Vomiting: No  · Diarrhea: No  · Blood in Stool: No  · Abdominal Pain: No  · Itchy Skin: YES  · Painful Joints: No  · Swollen Joints: No  · Muscle Pain: No  · Irregular Mole: No  · Sun Burn: No  · Dry Skin: YES  · Skin Color Changes: No  · Scar or Keloid: No  · Cold Sores/Fever Blisters: No  · Bacterial Infections/MRSA: No  · Anxiety: YES  · Depression: No  · Suicidal or Homicidal Thoughts: No      PSYCH: Normal mood and affect  EYES: Normal conjunctiva  ENT: Normal lips and oral mucosa  CARDIOVASCULAR: No edema  RESPIRATORY: Normal respirations  HEME/LYMPH/IMMUNO:  No regional lymphadenopathy except as noted below in ASSESSMENT AND PLAN BY DIAGNOSIS    FULL ORGAN SYSTEM SKIN EXAM (SKIN)  Hair, Scalp, Ears, Face Normal except as noted below in Assessment                                                 1  PSORIASIS   Physical Exam:   Anatomic Location Affected:  Right area of scalp, and neck     Morphological Description:  Negligible cephalic findings  No findings elsewhere aside from mild erythematotelangiectatic rosacea as below   Pertinent Positives:   Pertinent Negatives: Additional History of Present Condition:      Assessment and Plan:  Based on a thorough discussion of this condition and the management approach to it (including a comprehensive discussion of the known risks, side effects and potential benefits of treatment), the patient (family) agrees to implement the following specific plan:   Continue using prescribed Taltz injection  Start injecting every 4 weeks from now on   Recommend patient to follow up in 2-3 months for check up  2  ROSACEA     Physical Exam:   Anatomic Location Affected:  Slight bilateral cheek erythema  [facility] Morphological Description:     Pertinent Positives:   Pertinent Negatives: Additional History of Present Condition:   Assessment and Plan:  Based on a thorough discussion of this condition and the management approach to it (including a comprehensive discussion of the known risks, side effects and potential benefits of treatment), the patient (family) agrees to implement the following specific plan:   Start using prescribed Metronidazole 0 75% cream Apply topically to face area twice a day as needed   Recommend patient to follow up in 3 months for check up  Rosacea is a chronic rash affecting the mid-face including the nose, cheeks, chin, forehead, and eyelids  The incidence is usually greatest between the ages of 30-60 years and is more common in people with fair skin  Common characteristics include redness, telangiectasias, papules and pustules over affected areas   Rosacea may look similar to acne, but there is a lack of comedones  Occasionally the eyes may also be involved in ocular rosacea  In advanced disease, enlargement of the sebaceous glands in the nose, termed rhinophyma, may be present  Rosacea results in red spots (papules) and sometimes pustules over the face, but unlike acne there are no blackheads, whiteheads, or cystic nodules  Patients often experience increased facial flushing with prominent blood vessels (erythematotelangiectatic rosacea) and dry, sensitive skin  These symptoms are exacerbated by sun exposure, hot or spicy foods, topical steroids and oil-based facial products  In ocular rosacea, eyelids may be red and sore due to conjunctivitis, keratitis, and episcleritis  If rhinophyma develops due to enlargement of sebaceous glands, the patient may have an enlarged and irregularly shaped nose with prominent pores  In rosacea that is refractory to treatment, patients can develop persistent redness and swelling of the face due to lymphatic obstruction (Morbihan disease)  Distribution around the cheeks may be confused with the malar or butterfly rash of lupus  However, the rash of lupus spares the nasal creases and lacks papules and pustules  If signs of photosensitivity, oral ulcers, arthritis, and kidney dysfunction are present then consider referral to a rheumatologist      There are many potential causes of rosacea including genetic, environmental, vascular, and inflammatory factors   These include, but are not limited to:   Chronic exposure to ultraviolet radiation    Increased immune responses in the form of cathelicidins that promote vessel dilation and infiltration with white blood cells (neutrophils) into the dermis   Increased matrix metalloproteinases such as collagen and elastase that remodel normal tissue may contribute to inflammation of the skin making it thicker and harder   There is some evidence to suggest that increased numbers of demodex mites on patient skin may contribute to rosacea papules     General Treatment Approach    Avoid exacerbating factors such as heat, spicy foods, and alcohol    Use daily SPF30+ sunscreen and other methods of coverage for sun protection   Use water-based make-up    Avoid applying topical steroids to affected areas as they can cause perioral dermatitis and exacerbate rosacea     Topical Treatment Approach   Metronidazole cream or gel by itself or in combination with oral antibiotics for more severe cases   Azelaic acid cream or lotion is effective for mild inflammatory rosacea when applied twice daily to affected areas   Brimonidine gel and oxymetazoline hydrochloride cream can reduce facial redness temporarily    Ivermectin cream can treat papulopustular rosacea by controlling demodex mites and inflammation    Pimecrolimus cream or tacrolimus ointment twice a day for 2-3 months can help reduce inflammation    Oral Treatment Approach   Antibiotics such as doxycycline, minocycline, or erythromycin for 1-3 months   Clonidine and carvedilol can help reduce facial flushing and are generally well tolerated  Common side effects include low blood pressure, gastrointestinal upset, dry eyes, blurred vision and low heart rate   Isotretinoin at low doses can be effective for long term treatment when antibiotics fail  Side effects may make it unsuitable for some patients   NSAIDs such as diclofenac can help reduce discomfort and redness in the skin       Procedural/Surgical Treatment Approach    Vascular lasers or intense pulsed light treatment may be used to treat persistent telangiectasia and papulopustular rosacea   Plastic surgery and carbon dioxide lasers may be used to treat rhinophyma       Scribe Attestation    I,:  Brent Solorio MA am acting as a scribe while in the presence of the attending physician :       I,:  Tracie Awad MD personally performed the services described in this documentation    as scribed in my presence :

## 2021-09-13 NOTE — PATIENT INSTRUCTIONS
1  PSORIASIS       Assessment and Plan:  Based on a thorough discussion of this condition and the management approach to it (including a comprehensive discussion of the known risks, side effects and potential benefits of treatment), the patient (family) agrees to implement the following specific plan:   Continue using prescribed Taltz injection  Start injecting every 4 weeks from now on   Recommend patient to follow up in 2-3 months for check up  2  ROSACEA        Assessment and Plan:  Based on a thorough discussion of this condition and the management approach to it (including a comprehensive discussion of the known risks, side effects and potential benefits of treatment), the patient (family) agrees to implement the following specific plan:   Start using prescribed Metronidazole 0 75% cream Apply topically to face area twice a day as needed   Recommend patient to follow up in 3 months for check up  Rosacea is a chronic rash affecting the mid-face including the nose, cheeks, chin, forehead, and eyelids  The incidence is usually greatest between the ages of 30-60 years and is more common in people with fair skin  Common characteristics include redness, telangiectasias, papules and pustules over affected areas  Rosacea may look similar to acne, but there is a lack of comedones  Occasionally the eyes may also be involved in ocular rosacea  In advanced disease, enlargement of the sebaceous glands in the nose, termed rhinophyma, may be present  Rosacea results in red spots (papules) and sometimes pustules over the face, but unlike acne there are no blackheads, whiteheads, or cystic nodules  Patients often experience increased facial flushing with prominent blood vessels (erythematotelangiectatic rosacea) and dry, sensitive skin  These symptoms are exacerbated by sun exposure, hot or spicy foods, topical steroids and oil-based facial products       In ocular rosacea, eyelids may be red and sore due to conjunctivitis, keratitis, and episcleritis  If rhinophyma develops due to enlargement of sebaceous glands, the patient may have an enlarged and irregularly shaped nose with prominent pores  In rosacea that is refractory to treatment, patients can develop persistent redness and swelling of the face due to lymphatic obstruction (Morbihan disease)  Distribution around the cheeks may be confused with the malar or butterfly rash of lupus  However, the rash of lupus spares the nasal creases and lacks papules and pustules  If signs of photosensitivity, oral ulcers, arthritis, and kidney dysfunction are present then consider referral to a rheumatologist      There are many potential causes of rosacea including genetic, environmental, vascular, and inflammatory factors   These include, but are not limited to:   Chronic exposure to ultraviolet radiation    Increased immune responses in the form of cathelicidins that promote vessel dilation and infiltration with white blood cells (neutrophils) into the dermis   Increased matrix metalloproteinases such as collagen and elastase that remodel normal tissue may contribute to inflammation of the skin making it thicker and harder   There is some evidence to suggest that increased numbers of demodex mites on patient skin may contribute to rosacea papules     General Treatment Approach    Avoid exacerbating factors such as heat, spicy foods, and alcohol    Use daily SPF30+ sunscreen and other methods of coverage for sun protection   Use water-based make-up    Avoid applying topical steroids to affected areas as they can cause perioral dermatitis and exacerbate rosacea     Topical Treatment Approach   Metronidazole cream or gel by itself or in combination with oral antibiotics for more severe cases   Azelaic acid cream or lotion is effective for mild inflammatory rosacea when applied twice daily to affected areas   Brimonidine gel and oxymetazoline hydrochloride cream can reduce facial redness temporarily    Ivermectin cream can treat papulopustular rosacea by controlling demodex mites and inflammation    Pimecrolimus cream or tacrolimus ointment twice a day for 2-3 months can help reduce inflammation    Oral Treatment Approach   Antibiotics such as doxycycline, minocycline, or erythromycin for 1-3 months   Clonidine and carvedilol can help reduce facial flushing and are generally well tolerated  Common side effects include low blood pressure, gastrointestinal upset, dry eyes, blurred vision and low heart rate   Isotretinoin at low doses can be effective for long term treatment when antibiotics fail  Side effects may make it unsuitable for some patients   NSAIDs such as diclofenac can help reduce discomfort and redness in the skin       Procedural/Surgical Treatment Approach    Vascular lasers or intense pulsed light treatment may be used to treat persistent telangiectasia and papulopustular rosacea   Plastic surgery and carbon dioxide lasers may be used to treat rhinophyma

## 2021-09-23 ENCOUNTER — TELEPHONE (OUTPATIENT)
Dept: DERMATOLOGY | Facility: CLINIC | Age: 26
End: 2021-09-23

## 2021-10-12 ENCOUNTER — OFFICE VISIT (OUTPATIENT)
Dept: DERMATOLOGY | Facility: CLINIC | Age: 26
End: 2021-10-12
Payer: COMMERCIAL

## 2021-10-12 VITALS — TEMPERATURE: 98.2 F | BODY MASS INDEX: 35.16 KG/M2 | WEIGHT: 232 LBS | HEIGHT: 68 IN

## 2021-10-12 DIAGNOSIS — D22.39 FIBROUS PAPULE OF NOSE: Primary | ICD-10-CM

## 2021-10-12 PROCEDURE — 99213 OFFICE O/P EST LOW 20 MIN: CPT | Performed by: STUDENT IN AN ORGANIZED HEALTH CARE EDUCATION/TRAINING PROGRAM

## 2021-11-19 ENCOUNTER — TELEPHONE (OUTPATIENT)
Dept: DERMATOLOGY | Facility: CLINIC | Age: 26
End: 2021-11-19

## 2021-11-19 DIAGNOSIS — L40.9 PSORIASIS: ICD-10-CM

## 2022-09-16 ENCOUNTER — TELEPHONE (OUTPATIENT)
Dept: DERMATOLOGY | Facility: CLINIC | Age: 27
End: 2022-09-16

## 2022-09-16 NOTE — TELEPHONE ENCOUNTER
As best I can tell, I saw the patient once more than a year ago  I suggest an appointment with a provider in our department

## 2022-09-16 NOTE — TELEPHONE ENCOUNTER
Patient left a message stating she cannot get an appointment until 11/1/22 but her psoriasis is very bad and she is really having a bad flare  Patient has been taking Taltz but cannot submitted for re authorization until seen 11/2/22  Patient is asking for something topical that can hold her over until her appointment

## 2022-09-19 NOTE — TELEPHONE ENCOUNTER
Attempt to reach patient  Left message asking patient to call and schedule a virtual appointment as we have sooner appointments virtually

## 2022-11-01 ENCOUNTER — OFFICE VISIT (OUTPATIENT)
Dept: DERMATOLOGY | Facility: CLINIC | Age: 27
End: 2022-11-01

## 2022-11-01 VITALS — TEMPERATURE: 97.5 F | WEIGHT: 230 LBS | HEIGHT: 68 IN | BODY MASS INDEX: 34.86 KG/M2

## 2022-11-01 DIAGNOSIS — L40.9 PSORIASIS: Primary | ICD-10-CM

## 2022-11-01 RX ORDER — TRIAMCINOLONE ACETONIDE 1 MG/G
CREAM TOPICAL 2 TIMES DAILY
Qty: 80 G | Refills: 1 | Status: SHIPPED | OUTPATIENT
Start: 2022-11-01

## 2022-11-01 RX ORDER — NEBIVOLOL 5 MG/1
5 TABLET ORAL DAILY
COMMUNITY
Start: 2022-09-02

## 2022-11-01 RX ORDER — CLOBETASOL PROPIONATE 0.46 MG/ML
SOLUTION TOPICAL
Qty: 50 ML | Refills: 1 | Status: SHIPPED | OUTPATIENT
Start: 2022-11-01

## 2022-11-01 NOTE — PATIENT INSTRUCTIONS
PSORIASIS- FOLLOW UP       Assessment and Plan:  Based on a thorough discussion of this condition and the management approach to it (including a comprehensive discussion of the known risks, side effects and potential benefits of treatment), the patient (family) agrees to implement the following specific plan:  Discussed starting process with insurance to restart Taltz  Start Clobetasol scalp solutions twice a day twice a day, up to 14 days and then one week break in between use  Triamcinolone cream for behind the ears twice a day, up to 14 days and then one week break in between use  Complete lab work for TB      Psoriasis is a chronic inflammatory condition that causes the body to make new skin cells in days rather than weeks  As these cells pile up on the surface of the skin, you may see thick, scaly patches of thickened skin  Psoriasis affects 2-4% of males and females  It can start at any age including childhood, with peaks of onset at 15-25 years and 50-60 years  It tends to persist lifelong, fluctuating in extent and severity  It is particularly common in Caucasians but may affect people of any race  About one-third of patients with psoriasis have family members with psoriasis  Psoriasis is multifactorial  It is classified as an immune-mediated inflammatory disease (IMID)  Genetic factors are important and influence the type of psoriasis and response to treatment  What are the signs and symptoms of psoriasis? There are many different types of psoriasis that each have present uniquely  The types of psoriasis include:    Plaque psoriasis: About 80% to 90% of people who have psoriasis develop this type  When plaque psoriasis appears, you may see:  Plaque psoriasis usually presents with symmetrically distributed, red, scaly plaques with well-defined edges  The scale is typically silvery white, except in skin folds where the plaques often appear shiny and they may have a moist peeling surface   The most common sites are scalp, elbows and knees, but any part of the skin can be involved  The plaques are usually very persistent without treatment  Itch is mostly mild but may be severe in some patients, leading to scratching and lichenification (thickened leathery skin with increased skin markings)  Painful skin cracks or fissures may occur  When psoriatic plaques clear up, they may leave brown or pale marks that can be expected to fade over several months  Guttate psoriasis: When someone gets this type of psoriasis, you often see tiny bumps appear on the skin quite suddenly  The bumps tend to cover much of the torso, legs, and arms  Sometimes, the bumps also develop on the face, scalp, and ears  No matter where they appear, the bumps tend to be:   Small and scaly  [place]-colored to pink  Temporary, clearing in a few weeks or months without treatment  When guttate psoriasis clears, it may never return  Why this happens is still a bit of a mystery  Guttate psoriasis tends to develop in children and young adults who've had an infection, such as strep throat  It's possible that when the infection clears so does guttate psoriasis  It's also possible to have:  Guttate psoriasis for life  See the guttate psoriasis clear and plaque psoriasis develop later in life  Plaque psoriasis when you develop guttate psoriasis  There's no way to predict what will happen after the first flare-up of guttate psoriasis clears  Inverse psoriasis: This type of psoriasis develops in areas where skin touches skin, such as the armpits, genitals, and crease of the buttocks  Where the inverse psoriasis appears, you're likely to notice:  Smooth, red patches of skin that look raw  Little, if any, silvery-white coating  Sore or painful skin  Other names for this type of psoriasis are intertriginous psoriasis or flexural psoriasis  Pustular psoriasis:  This type of psoriasis causes pus-filled bumps that usually appear only on the feet and hands  While the pus-filled bumps may look like an infection, the skin is not infected  The bumps don't contain bacteria or anything else that could cause an infection  Where pustular psoriasis appears, you tend to notice:  Red, swollen skin that is dotted with pus-filled bumps  Extremely sore or painful skin  Brown dots (and sometimes scale) appear as the pus-filled bumps dry  Pustular psoriasis can make just about any activity that requires your hands or feet, such as typing or walking, unbearably painful  Pustular psoriasis (generalized): Serious and life-threatening, this rare type of psoriasis causes pus-filled bumps to develop on much of the skin  Also called von Zumbusch psoriasis, a flare-up causes this sequence of events:  Skin on most of the body suddenly turns dry, red, and tender  Within hours, pus-filled bumps cover most of the skin  Often within a day, the pus-filled bumps break open and pools of pus leak onto the skin  As the pus dries (usually within 24 to 48 hours), the skin dries out and peels (as shown in this picture)  When the dried skin peels off, you see a smooth, glazed surface  In a few days or weeks, you may see a new crop of pus-filled bumps covering most of the skin, as the cycle repeats itself  Anyone with pustular psoriasis also feels very sick, and may develop a fever, headache, muscle weakness, and other symptoms  Medical care is often necessary to save the person's life  Erythrodermic psoriasis: Serious and life-threatening, this type of psoriasis requires immediate medical care  When someone develops erythrodermic psoriasis, you may notice:  Skin on most of the body looks burnt  Chills, fever, and the person looks extremely ill  Muscle weakness, a rapid pulse, and severe itch  The person may also be unable to keep warm, so hypothermia can set in quickly  Most people who develop this type of psoriasis already have another type of psoriasis   Before developing erythrodermic psoriasis, they often notice that their psoriasis is worsening or not improving with treatment  If you notice either of these happening, see a board-certified dermatologist     Nails    Nail psoriasis: With any type of psoriasis, you may see changes to your fingernails or toenails  About half of the people who have plaque psoriasis see signs of psoriasis on their fingernails at some point2  When psoriasis affects the nails, you may notice:  Tiny dents in your nails (called “nail pits”)  White, yellow, or brown discoloration under one or more nails  Crumbling, rough nails  A nail lifting up so that it's no longer attached  Buildup of skin cells beneath one or more nails, which lifts up the nail  Treatment and proper nail care can help you control nail psoriasis  Psoriatic arthritis: If you have psoriasis, it's important to pay attention to your joints  Some people who have psoriasis develop a type of arthritis called psoriatic arthritis  This is more likely to occur if you have severe psoriasis  Most people notice psoriasis on their skin years before they develop psoriatic arthritis  It's also possible to get psoriatic arthritis before psoriasis, but this is less common  When psoriatic arthritis develops, the signs can be subtle  At first, you may notice:  A swollen and tender joint, especially in a finger or toe  Heel pain  Swelling on the back of your leg, just above your heel  Stiffness in the morning that fades during the day  Like psoriasis, psoriatic arthritis cannot be cured  Treatment can prevent psoriatic arthritis from worsening, which is important  Allowed to progress, psoriatic arthritis can become disabling  Diagnosis and treatment of psoriasis   Psoriasis is usually diagnosed by clinical features, and skin biopsy if necessary  It is important to decrease factors that aggravate psoriasis   These include treating streptococcal infections, minimizing skin injuries, avoiding sun exposure if it exacerbates psoriasis, smoking, alcohol usage, decreasing stress, and maintaining an optimal body weight  Certain medications such as lithium, beta blockers, antimalarials, and NSAIDs have also been implicated  Suddenly stopping oral steroids or strong topical steroids can cause rebound disease  There are many categories of psoriasis treatments available  Topical therapy  Mild psoriasis is generally treated with topical agents alone  Which treatment is selected may depend on body site, extent and severity of psoriasis  Emollients  Coal tar preparations  Dithranol  Salicylic acid  Vitamin D analogue (calcipotriol)  Topical corticosteroids  Calcineurin inhibitor (tacrolimus, pimecrolimus)  Phototherapy  Most psoriasis centres offer phototherapy with ultraviolet (UV) radiation, often in combination with topical or systemic agents  Types of phototherapy include  Narrowband UVB  Broadband UVB  Photochemotherapy (PUVA)  Targeted phototherapy  Systemic therapy  Moderate to severe psoriasis warrants treatment with a systemic agent and/or phototherapy  The most common treatments are:  Methotrexate  Ciclosporin  Acitretin  Other medicines occasionally used for psoriasis include:  Mycophenolate  Apremilast  Hydroxyurea  Azathioprine  6-mercaptopurine  Systemic corticosteroids are best avoided due to a risk of severe withdrawal flare of psoriasis and adverse effects  Biologics or targeted therapies are reserved for conventional treatment-resistant severe psoriasis, mainly because of expense, as side effects compare favorably with other systemic agents  These include:  Anti-tumour necrosis factor-alpha antagonists (anti-TNF?) infliximab, adalimumab and etanercept  The interleukin (IL)-12/23 antagonist ustekinumab  IL-17 antagonists such as secukinumab  Many other monoclonal antibodies are under investigation in the treatment of psoriasis      er

## 2022-11-01 NOTE — PROGRESS NOTES
[facility] Note     Patient Name: Sterling Faye  Encounter Date: 11/1/2022     Have you been cared for by a [facility] in the last 3 years and, if so, which description applies to you? YES  I HAVE been seen here within the last 3 years, and my medical history HAS changed  I am FEMALE/of child-bearing potential     REVIEW OF SYSTEMS:  Have you recently had or currently have any of the following? · Recent fever or chills? No  · Any non-healing wound? No  · Are you pregnant or planning to become pregnant? No  · Are you currently or planning to be nursing or breast feeding? No   PAST MEDICAL HISTORY:  Have you personally ever had or currently have any of the following? If "YES," then please provide more detail  · Skin cancer (such as Melanoma, Basal Cell Carcinoma, Squamous Cell Carcinoma? No  · Tuberculosis, HIV/AIDS, Hepatitis B or C: No  · Systemic Immunosuppression such as Diabetes, Biologic or Immunotherapy, Chemotherapy, Organ Transplantation, Bone Marrow Transplantation No  · Radiation Treatment No   FAMILY HISTORY:  Any "first degree relatives" (parent, brother, sister, or child) with the following? • Skin Cancer, Pancreatic or Other Cancer? No   PATIENT EXPERIENCE:    • Do you want the Dermatologist to perform a COMPLETE skin exam today including a clinical examination under the "bra and underwear" areas? NO  • If necessary, do we have your permission to call and leave a detailed message on your Preferred Phone number that includes your specific medical information?   Yes      Allergies   Allergen Reactions   • Cefixime Hives     As an infant - has taken amox/pcn/augmentin w/o reaction    • Amoxicillin Rash     Day 6 6/8/2021 - see allergist note      Current Outpatient Medications:   •  aluminum hydroxide-magnesium carbonate (GAVISCON)  mg/15 mL oral suspension, Take 15 mL by mouth daily at bedtime, Disp: 355 mL, Rfl: 5  •  cetirizine (ZyrTEC) 10 mg tablet, Take 10 mg by mouth daily, Disp: , Rfl:   •  fluticasone (FLONASE) 50 mcg/act nasal spray, 1 spray into each nostril daily, Disp: 18 2 mL, Rfl: 11  •  LARISSIA 0 1-20 MG-MCG per tablet, TAKE 1 TABLET (20 MCG TOTAL) BY MOUTH DAILY  , Disp: , Rfl: 3  •  nebivolol (BYSTOLIC) 5 mg tablet, Take 5 mg by mouth daily, Disp: , Rfl:   •  famotidine (PEPCID) 20 mg tablet, Take 20 mg by mouth daily before dinner (Patient not taking: Reported on 11/1/2022), Disp: , Rfl:   •  Ixekizumab 80 MG/ML SOAJ, 160 mg SC for first dose then 80 mg every 2 weeks for three months then once a month starting week 16 (Patient not taking: Reported on 11/1/2022), Disp: 2 mL, Rfl: 6  •  metroNIDAZOLE (METROCREAM) 0 75 % cream, Apply topically 2 (two) times a day to face area  (Patient not taking: Reported on 11/1/2022), Disp: 45 g, Rfl: 2  •  omeprazole (PriLOSEC) 40 MG capsule, TAKE 1 CAPSULE BY MOUTH EVERY DAY BEFORE BREAKFAST (Patient not taking: Reported on 11/1/2022), Disp: 90 capsule, Rfl: 1          • Whom besides the patient is providing clinical information about today's encounter?   o NO ADDITIONAL HISTORIAN (patient alone provided history)    Physical Exam and Assessment/Plan by Diagnosis:    PSORIASIS- FOLLOW UP     Physical Exam:  • Anatomic Location Affected:  Hairline on neck/scalp and bilateral ear area   • Morphological Description: Thick erythematous scaly plaques diffusely in scalp, postauricular neck, conchal bowls, preauricular  • Severity: moderate  • Body Percent Affected: 10  • Pertinent Positives:  • Pertinent Negatives: Additional History of Present Condition: 33 yo female with was on [address] [employer] [facility] with great control but has been off for a year due to switching insurance  She previously tried Tremfya (ineffective) and Cosentyx (experienced palpitations but worked well)  Scalp, ears and neck are only sites flaring but are severe  She denies any joint pain,redness, or swelling  PMH includes anxiety and borderline HTN      Assessment and Plan:  Based on a thorough discussion of this condition and the management approach to it (including a comprehensive discussion of the known risks, side effects and potential benefits of treatment), the patient (family) agrees to implement the following specific plan:  • Discussed starting process with insurance to restart Taltz  Warned it is possible she could have antibodies since she has been off for one year, but given that she tolerated well and it was effective, will start PA for Taltz  • Start Clobetasol scalp solutions twice a day twice a day, up to 14 days and then one week break in between use  • Triamcinolone cream for behind the ears twice a day, up to 14 days and then one week break in between use  • Complete lab work for TB so we can initiate PA     Psoriasis is a chronic inflammatory condition that causes the body to make new skin cells in days rather than weeks  As these cells pile up on the surface of the skin, you may see thick, scaly patches of thickened skin  Psoriasis affects 2-4% of males and females  It can start at any age including childhood, with peaks of onset at 15-25 years and 50-60 years  It tends to persist lifelong, fluctuating in extent and severity  It is particularly common in Caucasians but may affect people of any race  About one-third of patients with psoriasis have family members with psoriasis  Psoriasis is multifactorial  It is classified as an immune-mediated inflammatory disease (IMID)  Genetic factors are important and influence the type of psoriasis and response to treatment  What are the signs and symptoms of psoriasis? There are many different types of psoriasis that each have present uniquely  The types of psoriasis include:    Plaque psoriasis: About 80% to 90% of people who have psoriasis develop this type   When plaque psoriasis appears, you may see:  Plaque psoriasis usually presents with symmetrically distributed, red, scaly plaques with well-defined edges  The scale is typically silvery white, except in skin folds where the plaques often appear shiny and they may have a moist peeling surface  The most common sites are scalp, elbows and knees, but any part of the skin can be involved  The plaques are usually very persistent without treatment  Itch is mostly mild but may be severe in some patients, leading to scratching and lichenification (thickened leathery skin with increased skin markings)  Painful skin cracks or fissures may occur  When psoriatic plaques clear up, they may leave brown or pale marks that can be expected to fade over several months  Guttate psoriasis: When someone gets this type of psoriasis, you often see tiny bumps appear on the skin quite suddenly  The bumps tend to cover much of the torso, legs, and arms  Sometimes, the bumps also develop on the face, scalp, and ears  No matter where they appear, the bumps tend to be:   • Small and scaly  • [place]-colored to pink  • Temporary, clearing in a few weeks or months without treatment  When guttate psoriasis clears, it may never return  Why this happens is still a bit of a mystery  Guttate psoriasis tends to develop in children and young adults who've had an infection, such as strep throat  It's possible that when the infection clears so does guttate psoriasis  It's also possible to have:  • Guttate psoriasis for life  • See the guttate psoriasis clear and plaque psoriasis develop later in life  • Plaque psoriasis when you develop guttate psoriasis  There's no way to predict what will happen after the first flare-up of guttate psoriasis clears  Inverse psoriasis: This type of psoriasis develops in areas where skin touches skin, such as the armpits, genitals, and crease of the buttocks   Where the inverse psoriasis appears, you're likely to notice:  • Smooth, red patches of skin that look raw  • Little, if any, silvery-white coating  • Sore or painful skin  Other names for this type of psoriasis are intertriginous psoriasis or flexural psoriasis  Pustular psoriasis: This type of psoriasis causes pus-filled bumps that usually appear only on the feet and hands  While the pus-filled bumps may look like an infection, the skin is not infected  The bumps don't contain bacteria or anything else that could cause an infection  Where pustular psoriasis appears, you tend to notice:  • Red, swollen skin that is dotted with pus-filled bumps  • Extremely sore or painful skin  • Brown dots (and sometimes scale) appear as the pus-filled bumps dry  Pustular psoriasis can make just about any activity that requires your hands or feet, such as typing or walking, unbearably painful  Pustular psoriasis (generalized): Serious and life-threatening, this rare type of psoriasis causes pus-filled bumps to develop on much of the skin  Also called von Zumbusch psoriasis, a flare-up causes this sequence of events:  1  Skin on most of the body suddenly turns dry, red, and tender  2  Within hours, pus-filled bumps cover most of the skin  3  Often within a day, the pus-filled bumps break open and pools of pus leak onto the skin  4  As the pus dries (usually within 24 to 48 hours), the skin dries out and peels (as shown in this picture)  5  When the dried skin peels off, you see a smooth, glazed surface  6  In a few days or weeks, you may see a new crop of pus-filled bumps covering most of the skin, as the cycle repeats itself  Anyone with pustular psoriasis also feels very sick, and may develop a fever, headache, muscle weakness, and other symptoms  Medical care is often necessary to save the person's life  Erythrodermic psoriasis: Serious and life-threatening, this type of psoriasis requires immediate medical care   When someone develops erythrodermic psoriasis, you may notice:  • Skin on most of the body looks burnt  • Chills, fever, and the person looks extremely ill  • Muscle weakness, a rapid pulse, and severe itch  The person may also be unable to keep warm, so hypothermia can set in quickly  Most people who develop this type of psoriasis already have another type of psoriasis  Before developing erythrodermic psoriasis, they often notice that their psoriasis is worsening or not improving with treatment  If you notice either of these happening, see a board-certified dermatologist     Nails    Nail psoriasis: With any type of psoriasis, you may see changes to your fingernails or toenails  About half of the people who have plaque psoriasis see signs of psoriasis on their fingernails at some point2  When psoriasis affects the nails, you may notice:  • Tiny dents in your nails (called “nail pits”)  • White, yellow, or brown discoloration under one or more nails  • Crumbling, rough nails  • A nail lifting up so that it's no longer attached  • Buildup of skin cells beneath one or more nails, which lifts up the nail  Treatment and proper nail care can help you control nail psoriasis  Psoriatic arthritis: If you have psoriasis, it's important to pay attention to your joints  Some people who have psoriasis develop a type of arthritis called psoriatic arthritis  This is more likely to occur if you have severe psoriasis  Most people notice psoriasis on their skin years before they develop psoriatic arthritis  It's also possible to get psoriatic arthritis before psoriasis, but this is less common  When psoriatic arthritis develops, the signs can be subtle  At first, you may notice:  • A swollen and tender joint, especially in a finger or toe  • Heel pain  • Swelling on the back of your leg, just above your heel  • Stiffness in the morning that fades during the day  Like psoriasis, psoriatic arthritis cannot be cured  Treatment can prevent psoriatic arthritis from worsening, which is important  Allowed to progress, psoriatic arthritis can become disabling      Diagnosis and treatment of psoriasis   Psoriasis is usually diagnosed by clinical features, and skin biopsy if necessary  It is important to decrease factors that aggravate psoriasis  These include treating streptococcal infections, minimizing skin injuries, avoiding sun exposure if it exacerbates psoriasis, smoking, alcohol usage, decreasing stress, and maintaining an optimal body weight  Certain medications such as lithium, beta blockers, antimalarials, and NSAIDs have also been implicated  Suddenly stopping oral steroids or strong topical steroids can cause rebound disease  There are many categories of psoriasis treatments available  Topical therapy  Mild psoriasis is generally treated with topical agents alone  Which treatment is selected may depend on body site, extent and severity of psoriasis  • Emollients  • Coal tar preparations  • Dithranol  • Salicylic acid  • Vitamin D analogue (calcipotriol)  • Topical corticosteroids  • Calcineurin inhibitor (tacrolimus, pimecrolimus)  Phototherapy  Most psoriasis centres offer phototherapy with ultraviolet (UV) radiation, often in combination with topical or systemic agents  Types of phototherapy include  • Narrowband UVB  • Broadband UVB  • Photochemotherapy (PUVA)  • Targeted phototherapy  Systemic therapy  Moderate to severe psoriasis warrants treatment with a systemic agent and/or phototherapy  The most common treatments are:  • Methotrexate  • Ciclosporin  • Acitretin  Other medicines occasionally used for psoriasis include:  • Mycophenolate  • Apremilast  • Hydroxyurea  • Azathioprine  • 6-mercaptopurine  Systemic corticosteroids are best avoided due to a risk of severe withdrawal flare of psoriasis and adverse effects  Biologics or targeted therapies are reserved for conventional treatment-resistant severe psoriasis, mainly because of expense, as side effects compare favorably with other systemic agents   These include:  • Anti-tumour necrosis factor-alpha antagonists (anti-TNF?) infliximab, adalimumab and etanercept  • The interleukin (IL)-12/23 antagonist ustekinumab  • IL-17 antagonists such as secukinumab  Many other monoclonal antibodies are under investigation in the treatment of psoriasis        Scribe Attestation    I,:  Minh Gaytan am acting as a scribe while in the presence of the attending physician :       I,:  Benton Carmichael MD personally performed the services described in this documentation    as scribed in my presence :

## 2022-11-02 ENCOUNTER — LAB (OUTPATIENT)
Dept: LAB | Age: 27
End: 2022-11-02

## 2022-11-02 DIAGNOSIS — L40.9 PSORIASIS: ICD-10-CM

## 2022-11-04 LAB
GAMMA INTERFERON BACKGROUND BLD IA-ACNC: 0.07 IU/ML
M TB IFN-G BLD-IMP: NEGATIVE
M TB IFN-G CD4+ BCKGRND COR BLD-ACNC: -0.01 IU/ML
M TB IFN-G CD4+ BCKGRND COR BLD-ACNC: 0 IU/ML
MITOGEN IGNF BCKGRD COR BLD-ACNC: >10 IU/ML

## 2022-12-05 ENCOUNTER — TELEPHONE (OUTPATIENT)
Dept: DERMATOLOGY | Facility: CLINIC | Age: 27
End: 2022-12-05

## 2022-12-06 NOTE — TELEPHONE ENCOUNTER
Prior authorization for Haley Cristobal was submitted through [facility]   Key# [id number]